# Patient Record
Sex: FEMALE | Race: WHITE | Employment: PART TIME | URBAN - NONMETROPOLITAN AREA
[De-identification: names, ages, dates, MRNs, and addresses within clinical notes are randomized per-mention and may not be internally consistent; named-entity substitution may affect disease eponyms.]

---

## 2024-11-08 ENCOUNTER — OFFICE VISIT (OUTPATIENT)
Dept: PRIMARY CARE CLINIC | Age: 29
End: 2024-11-08

## 2024-11-08 VITALS
DIASTOLIC BLOOD PRESSURE: 74 MMHG | TEMPERATURE: 98 F | OXYGEN SATURATION: 98 % | WEIGHT: 150.4 LBS | SYSTOLIC BLOOD PRESSURE: 112 MMHG | HEART RATE: 74 BPM

## 2024-11-08 DIAGNOSIS — S63.501A SPRAIN OF RIGHT WRIST, INITIAL ENCOUNTER: Primary | ICD-10-CM

## 2024-11-08 RX ORDER — LISDEXAMFETAMINE DIMESYLATE 40 MG/1
CAPSULE ORAL
COMMUNITY
Start: 2024-10-16

## 2024-11-08 RX ORDER — LURASIDONE HYDROCHLORIDE 80 MG/1
TABLET, FILM COATED ORAL
COMMUNITY
Start: 2024-10-31

## 2024-11-08 RX ORDER — PREDNISONE 20 MG/1
40 TABLET ORAL DAILY
Qty: 10 TABLET | Refills: 0 | Status: SHIPPED | OUTPATIENT
Start: 2024-11-08 | End: 2024-11-13

## 2024-11-08 RX ORDER — ONDANSETRON 4 MG/1
4 TABLET, FILM COATED ORAL EVERY 8 HOURS PRN
COMMUNITY

## 2024-11-08 RX ORDER — BUPROPION HYDROCHLORIDE 300 MG/1
TABLET ORAL
COMMUNITY
Start: 2024-10-31

## 2024-11-08 ASSESSMENT — ENCOUNTER SYMPTOMS: COLOR CHANGE: 0

## 2024-11-08 ASSESSMENT — PATIENT HEALTH QUESTIONNAIRE - PHQ9
SUM OF ALL RESPONSES TO PHQ QUESTIONS 1-9: 0
1. LITTLE INTEREST OR PLEASURE IN DOING THINGS: NOT AT ALL
SUM OF ALL RESPONSES TO PHQ QUESTIONS 1-9: 0
2. FEELING DOWN, DEPRESSED OR HOPELESS: NOT AT ALL
SUM OF ALL RESPONSES TO PHQ9 QUESTIONS 1 & 2: 0
SUM OF ALL RESPONSES TO PHQ QUESTIONS 1-9: 0
SUM OF ALL RESPONSES TO PHQ QUESTIONS 1-9: 0

## 2024-11-08 NOTE — PATIENT INSTRUCTIONS
Steroid x 5 days. Avoid NSAIDs while taking steroids  Keep right wrist in brace during activities  Apply ice 20 mins at a time 4-6 times day. May alternate with heat  May use ibuprofen/ tylenol as needed for pain  Decrease activity today and tomorrow may increase as tolerated after two days of rest  If symptoms worsen follow up with PCP  Patient verbalized understanding and agrees with plan of care

## 2024-11-08 NOTE — PROGRESS NOTES
[facility]  [address] [facility] [address]  Phone: [phone]  Fax: [phone]      Venice Burgos  1995  MRN: [id number]  Date of visit: 11/8/2024    Chief Complaint:     Venice Burgos is here for c/o of Wrist Pain (Right wrist thinks from over using)      HPI:     Venice Burgos is a 29 y.o. female who presents to the [facility] today for her medical conditions/complaints as noted below.    Wrist Pain   The pain is present in the right wrist. This is a new problem. Episode onset: 2 days. There has been no history of extremity trauma. The problem occurs constantly. The problem has been gradually worsening. Quality: throbbing. The pain is at a severity of 6/10. Associated symptoms include joint swelling. Pertinent negatives include no limited range of motion, numbness or tingling. The symptoms are aggravated by activity. She has tried NSAIDS for the symptoms. The treatment provided no relief.   Works at [employer], repetitive motions    History reviewed. No pertinent past medical history.     Allergies   Allergen Reactions    Cephalexin Anaphylaxis and Swelling    Meloxicam Anaphylaxis, Headaches and Other (See Comments)    Penicillins Anaphylaxis    Metoclopramide Headaches and Other (See Comments)     Migraines/ light sensitivity         Subjective:      Review of Systems   Musculoskeletal:  Positive for arthralgias, joint swelling and myalgias.   Skin:  Negative for color change.   Neurological:  Negative for tingling and numbness.       Objective:     Vitals:    11/08/24 0932   BP: 112/74   Site: Right Upper Arm   Position: Sitting   Cuff Size: Large Adult   Pulse: 74   Temp: 98 °F (36.7 °C)   TempSrc: Tympanic   SpO2: 98%   Weight: 68.2 kg (150 lb 6.4 oz)     There is no height or weight on file to calculate BMI.    Physical Exam  Vitals and nursing note reviewed.   Constitutional:

## 2025-03-13 ENCOUNTER — HOSPITAL ENCOUNTER (EMERGENCY)
Age: 30
Discharge: HOME | End: 2025-03-13
Attending: EMERGENCY MEDICINE
Payer: COMMERCIAL

## 2025-03-13 ENCOUNTER — APPOINTMENT (OUTPATIENT)
Dept: GENERAL RADIOLOGY | Age: 30
End: 2025-03-13
Payer: COMMERCIAL

## 2025-03-13 VITALS
SYSTOLIC BLOOD PRESSURE: 122 MMHG | TEMPERATURE: 97.9 F | BODY MASS INDEX: 21.69 KG/M2 | WEIGHT: 135 LBS | HEART RATE: 90 BPM | RESPIRATION RATE: 16 BRPM | DIASTOLIC BLOOD PRESSURE: 81 MMHG | OXYGEN SATURATION: 99 % | HEIGHT: 66 IN

## 2025-03-13 DIAGNOSIS — S61.213A LACERATION OF LEFT MIDDLE FINGER WITHOUT FOREIGN BODY WITHOUT DAMAGE TO NAIL, INITIAL ENCOUNTER: ICD-10-CM

## 2025-03-13 DIAGNOSIS — S60.039A: Primary | ICD-10-CM

## 2025-03-13 DIAGNOSIS — S60.00XA TRAUMATIC HEMATOMA OF FINGER, INITIAL ENCOUNTER: ICD-10-CM

## 2025-03-13 PROCEDURE — 73140 X-RAY EXAM OF FINGER(S): CPT

## 2025-03-13 PROCEDURE — 99283 EMERGENCY DEPT VISIT LOW MDM: CPT

## 2025-03-13 PROCEDURE — 12001 RPR S/N/AX/GEN/TRNK 2.5CM/<: CPT

## 2025-03-13 ASSESSMENT — PAIN - FUNCTIONAL ASSESSMENT: PAIN_FUNCTIONAL_ASSESSMENT: NONE - DENIES PAIN

## 2025-03-13 ASSESSMENT — LIFESTYLE VARIABLES
HOW OFTEN DO YOU HAVE A DRINK CONTAINING ALCOHOL: NEVER
HOW MANY STANDARD DRINKS CONTAINING ALCOHOL DO YOU HAVE ON A TYPICAL DAY: PATIENT DOES NOT DRINK

## 2025-03-13 ASSESSMENT — PAIN DESCRIPTION - ORIENTATION: ORIENTATION: LEFT

## 2025-03-13 ASSESSMENT — PAIN DESCRIPTION - LOCATION: LOCATION: FINGER (COMMENT WHICH ONE)

## 2025-03-13 ASSESSMENT — PAIN SCALES - GENERAL: PAINLEVEL_OUTOF10: 8

## 2025-03-13 NOTE — ED PROVIDER NOTES
[facility] Emergency Department  [address] [facility] [address]  Phone: [phone]        [facility] EMERGENCY DEPARTMENT  EMERGENCY DEPARTMENT ENCOUNTER      Pt Name: Venice Burgos  MRN: [id number]  Birthdate 1995  Date of evaluation: 3/13/2025  Provider: Starla Peng MD    CHIEF COMPLAINT       Chief Complaint   Patient presents with    Finger Injury     Left middle         HISTORY OF PRESENT ILLNESS   (Location/Symptom, Timing/Onset,Context/Setting, Quality, Duration, Modifying Factors, Severity)  Note limiting factors.   Venice Burgos is a 30 y.o. female who presents to the emergency department complaints of injury to the middle finger of her left hand.  Patient states that she works for [employer] and was lifting a box to put it on the conveyor belt when her finger got smashed by it.  No visible injury to the nailbed.  He does have a laceration just distal to the nailbed as well as a hematoma of the fat pad.  Nursing Notes were reviewed.    REVIEW OF SYSTEMS    (2-9systems for level 4, 10 or more for level 5)     Review of Systems    Except asnoted above the remainder of the review of systems was reviewed and negative.       PAST MEDICAL HISTORY   No past medical history on file.      SURGICAL HISTORY     No past surgical history on file.      CURRENT MEDICATIONS     Previous Medications    BUPROPION (WELLBUTRIN XL) 300 MG EXTENDED RELEASE TABLET        LURASIDONE (LATUDA) 80 MG TABS TABLET        ONDANSETRON (ZOFRAN) 4 MG TABLET    Take 1 tablet by mouth every 8 hours as needed for Nausea or Vomiting    VYVANSE 40 MG CAPS           ALLERGIES     Cephalexin, Meloxicam, Penicillins, and Metoclopramide    FAMILY HISTORY     No family history on file.       SOCIAL HISTORY       Social History     Socioeconomic History    Marital status:    Tobacco Use    Smoking status: Never    Smokeless tobacco: Never   Substance and Sexual Activity    Alcohol

## 2025-06-24 ENCOUNTER — OFFICE VISIT (OUTPATIENT)
Dept: PRIMARY CARE CLINIC | Age: 30
End: 2025-06-24
Payer: OTHER MISCELLANEOUS

## 2025-06-24 ENCOUNTER — HOSPITAL ENCOUNTER (OUTPATIENT)
Dept: GENERAL RADIOLOGY | Age: 30
Discharge: HOME | End: 2025-06-26
Payer: COMMERCIAL

## 2025-06-24 ENCOUNTER — RESULTS FOLLOW-UP (OUTPATIENT)
Dept: PRIMARY CARE CLINIC | Age: 30
End: 2025-06-24

## 2025-06-24 VITALS
BODY MASS INDEX: 19.85 KG/M2 | SYSTOLIC BLOOD PRESSURE: 122 MMHG | OXYGEN SATURATION: 97 % | DIASTOLIC BLOOD PRESSURE: 74 MMHG | RESPIRATION RATE: 16 BRPM | HEART RATE: 98 BPM | WEIGHT: 123 LBS

## 2025-06-24 DIAGNOSIS — M54.2 NECK PAIN: Primary | ICD-10-CM

## 2025-06-24 DIAGNOSIS — S46.811A STRAIN OF RIGHT TRAPEZIUS MUSCLE, INITIAL ENCOUNTER: ICD-10-CM

## 2025-06-24 DIAGNOSIS — S46.812A STRAIN OF LEFT TRAPEZIUS MUSCLE, INITIAL ENCOUNTER: ICD-10-CM

## 2025-06-24 DIAGNOSIS — M54.2 NECK PAIN: ICD-10-CM

## 2025-06-24 PROCEDURE — 99213 OFFICE O/P EST LOW 20 MIN: CPT

## 2025-06-24 PROCEDURE — 72040 X-RAY EXAM NECK SPINE 2-3 VW: CPT

## 2025-06-24 RX ORDER — PREDNISONE 20 MG/1
20 TABLET ORAL 2 TIMES DAILY
Qty: 10 TABLET | Refills: 0 | Status: SHIPPED | OUTPATIENT
Start: 2025-06-24 | End: 2025-06-29

## 2025-06-24 RX ORDER — CYCLOBENZAPRINE HCL 5 MG
5 TABLET ORAL 3 TIMES DAILY PRN
Qty: 15 TABLET | Refills: 0 | Status: SHIPPED | OUTPATIENT
Start: 2025-06-24 | End: 2025-06-29

## 2025-06-24 ASSESSMENT — ENCOUNTER SYMPTOMS
CONSTIPATION: 0
NAUSEA: 0
ABDOMINAL PAIN: 0
SHORTNESS OF BREATH: 0
COLOR CHANGE: 0
VOMITING: 0
COUGH: 0
DIARRHEA: 0

## 2025-06-24 NOTE — PROGRESS NOTES
HENT:      Head: Normocephalic.      Right Ear: External ear normal.      Left Ear: External ear normal.      Nose: Nose normal.   Neck:        Comments: Moderate TTP along cervical spine as well as bilateral trapezius muscles. No erythema or ecchymosis present. ROM intact, but with accompanied pain.  Cardiovascular:      Rate and Rhythm: Normal rate and regular rhythm.      Heart sounds: Normal heart sounds.   Pulmonary:      Effort: Pulmonary effort is normal. No respiratory distress.      Breath sounds: Normal breath sounds. No stridor. No wheezing, rhonchi or rales.   Chest:      Chest wall: No tenderness.   Abdominal:      General: Bowel sounds are normal.   Musculoskeletal:      Cervical back: Tenderness present. No edema or erythema. Pain with movement, spinous process tenderness and muscular tenderness present. Normal range of motion.   Skin:     General: Skin is warm and dry.   Neurological:      Mental Status: She is alert and oriented to person, place, and time.   Psychiatric:         Mood and Affect: Mood normal.         Behavior: Behavior normal. Behavior is cooperative.         Thought Content: Thought content normal.         Judgment: Judgment normal.       Assessment and Plan     Results       Diagnosis Orders   1. Neck pain  XR CERVICAL SPINE (2-3 VIEWS)      2. Strain of left trapezius muscle, initial encounter  predniSONE (DELTASONE) 20 MG tablet    cyclobenzaprine (FLEXERIL) 5 MG tablet      3. Strain of right trapezius muscle, initial encounter  predniSONE (DELTASONE) 20 MG tablet    cyclobenzaprine (FLEXERIL) 5 MG tablet        Assessment & Plan  1. Neck pain.  The neck pain is likely due to muscle strain from attempting to prevent a fall at work. An x-ray of the cervical spine will be ordered to ensure proper alignment. A muscle relaxant will be prescribed to alleviate muscle tension, advised patient not to take if she has to drive and not to drink alcohol with taking. She should take

## 2025-06-24 NOTE — PATIENT INSTRUCTIONS
..         For any follow up appointments that you may need call [phone]    [facility]   [address] [place], [place] [id number] [phone]  Office hours are Monday -Friaday 8am-4:30pm.    OR      [facility]  [facility] - Experienced Medical Professionals  Our physicians are occupational health specialists experienced in diagnosing and treating workplace injuries and illnesses. A majority of [facility]' physicians are Board-Certified in Occcupational Medicine and as Medical Review Officers (MRO's), and several are experienced Certified Independent Medical Examiners (TREMAYNE's).  [facility] [employer] works with large industrial employers, small businesses, and those in between. No matter what services you require, we take the time to learn your business and know your people. [facility] [employer] is your total resource for all your occupational health needs.    Our Locations:  [facility]  [address] [place] [address]  [phone]    [facility]  [address], [place] [id number] [phone]    [facility]  [address] [place] [address] [id number] [phone]          Will call with final xray results  Take steroid with food in AM- avoid taking with Ibuprofen  Tylenol as needed for pain  Heat/ice to neck  Follow up if symptoms worsen or do not improve